# Patient Record
Sex: MALE | URBAN - METROPOLITAN AREA
[De-identification: names, ages, dates, MRNs, and addresses within clinical notes are randomized per-mention and may not be internally consistent; named-entity substitution may affect disease eponyms.]

---

## 2015-08-24 LAB
EXT 24 HR UR METANEPHRINE: ABNORMAL
EXT 24 HR UR NORMETANEPHRINE: ABNORMAL
EXT 24 HR UR NORMETANEPHRINE: ABNORMAL
EXT 25 HYDROXY VIT D2: ABNORMAL
EXT 25 HYDROXY VIT D3: ABNORMAL
EXT 5 HIAA 24 HR URINE: ABNORMAL
EXT 5 HIAA BLOOD: ABNORMAL
EXT ACTH: ABNORMAL
EXT AFP: ABNORMAL
EXT ALBUMIN: ABNORMAL
EXT ALKALINE PHOSPHATASE: ABNORMAL
EXT ALT: ABNORMAL
EXT AMYLASE: ABNORMAL
EXT ANTI ISLET CELL AB: ABNORMAL
EXT ANTI PARIETAL CELL AB: ABNORMAL
EXT ANTI THYROID AB: ABNORMAL
EXT AST: ABNORMAL
EXT BILIRUBIN DIRECT: ABNORMAL MG/DL
EXT BILIRUBIN TOTAL: ABNORMAL
EXT BK VIRUS DNA QN PCR: ABNORMAL
EXT BUN: ABNORMAL
EXT C PEPTIDE: ABNORMAL
EXT CA 125: ABNORMAL
EXT CA 19-9: ABNORMAL
EXT CA 27-29: ABNORMAL
EXT CALCITONIN: ABNORMAL
EXT CALCIUM: ABNORMAL
EXT CEA: ABNORMAL
EXT CHLORIDE: ABNORMAL
EXT CHOLESTEROL: ABNORMAL
EXT CHROMOGRANIN A: 30 NMOL/L (ref 0–5)
EXT CO2: ABNORMAL
EXT CREATININE UA: ABNORMAL
EXT CREATININE: ABNORMAL MG/DL
EXT CYCLOSPORONE LEVEL: ABNORMAL
EXT DOPAMINE: ABNORMAL
EXT EBV DNA BY PCR: ABNORMAL
EXT EPINEPHRINE: ABNORMAL
EXT FOLATE: ABNORMAL
EXT FREE T3: ABNORMAL
EXT FREE T4: ABNORMAL
EXT FSH: ABNORMAL
EXT GASTRIN RELEASING PEPTIDE: ABNORMAL
EXT GASTRIN RELEASING PEPTIDE: ABNORMAL
EXT GASTRIN: ABNORMAL
EXT GGT: ABNORMAL
EXT GHRELIN: ABNORMAL
EXT GLUCAGON: ABNORMAL
EXT GLUCOSE: ABNORMAL
EXT GROWTH HORMONE: ABNORMAL
EXT HCV RNA QUANT PCR: ABNORMAL
EXT HDL: ABNORMAL
EXT HEMATOCRIT: ABNORMAL
EXT HEMOGLOBIN A1C: ABNORMAL
EXT HEMOGLOBIN: ABNORMAL
EXT HISTAMINE 24 HR URINE: ABNORMAL
EXT HISTAMINE: ABNORMAL
EXT IGF-1: ABNORMAL
EXT IMMUNKNOW (NON-STIMULATED): ABNORMAL
EXT IMMUNKNOW (STIMULATED): ABNORMAL
EXT INR: ABNORMAL
EXT INSULIN: ABNORMAL
EXT LANREOTIDE LEVEL: ABNORMAL
EXT LDH, TOTAL: ABNORMAL
EXT LDL CHOLESTEROL: ABNORMAL
EXT LIPASE: ABNORMAL
EXT MAGNESIUM: ABNORMAL
EXT METANEPHRINE FREE PLASMA: ABNORMAL
EXT MOTILIN: ABNORMAL
EXT NEUROKININ A CAMB: ABNORMAL
EXT NEUROKININ A ISI: ABNORMAL
EXT NEUROTENSIN: ABNORMAL
EXT NOREPINEPHRINE: ABNORMAL
EXT NORMETANEPHRINE: ABNORMAL
EXT NSE: ABNORMAL
EXT OCTREOTIDE LEVEL: ABNORMAL
EXT PANCREASTATIN CAMB: ABNORMAL
EXT PANCREASTATIN ISI: ABNORMAL
EXT PANCREATIC POLYPEPTIDE: 2179.2 PG/ML (ref 0–418)
EXT PHOSPHORUS: ABNORMAL
EXT PLATELETS: ABNORMAL
EXT POTASSIUM: ABNORMAL
EXT PROGRAF LEVEL: ABNORMAL
EXT PROLACTIN: ABNORMAL
EXT PROTEIN TOTAL: ABNORMAL
EXT PROTEIN UA: ABNORMAL
EXT PT: ABNORMAL
EXT PTH, INTACT: ABNORMAL
EXT PTT: ABNORMAL
EXT RAPAMUNE LEVEL: ABNORMAL
EXT SEROTONIN: ABNORMAL
EXT SODIUM: ABNORMAL MMOL/L
EXT SOMATOSTATIN: ABNORMAL
EXT SUBSTANCE P: ABNORMAL
EXT TRIGLYCERIDES: ABNORMAL
EXT TRYPTASE: ABNORMAL
EXT TSH: ABNORMAL
EXT URIC ACID: ABNORMAL
EXT URINE AMYLASE U/HR: ABNORMAL
EXT URINE AMYLASE U/L: ABNORMAL
EXT VASOACTIVE INTESTINAL POLYPEPTIDE: ABNORMAL
EXT VITAMIN B12: ABNORMAL
EXT VMA 24 HR URINE: ABNORMAL
EXT WBC: ABNORMAL
NEURON SPECIFIC ENOLASE: ABNORMAL

## 2015-08-31 LAB
EXT 24 HR UR METANEPHRINE: NORMAL
EXT 24 HR UR NORMETANEPHRINE: NORMAL
EXT 24 HR UR NORMETANEPHRINE: NORMAL
EXT 25 HYDROXY VIT D2: NORMAL
EXT 25 HYDROXY VIT D3: NORMAL
EXT 5 HIAA 24 HR URINE: 5.5 MG/24HR (ref 0–14.9)
EXT 5 HIAA BLOOD: NORMAL
EXT ACTH: NORMAL
EXT AFP: NORMAL
EXT ALBUMIN: NORMAL
EXT ALKALINE PHOSPHATASE: NORMAL
EXT ALT: NORMAL
EXT AMYLASE: NORMAL
EXT ANTI ISLET CELL AB: NORMAL
EXT ANTI PARIETAL CELL AB: NORMAL
EXT ANTI THYROID AB: NORMAL
EXT AST: NORMAL
EXT BILIRUBIN DIRECT: NORMAL MG/DL
EXT BILIRUBIN TOTAL: NORMAL
EXT BK VIRUS DNA QN PCR: NORMAL
EXT BUN: NORMAL
EXT C PEPTIDE: NORMAL
EXT CA 125: NORMAL
EXT CA 19-9: NORMAL
EXT CA 27-29: NORMAL
EXT CALCITONIN: NORMAL
EXT CALCIUM: NORMAL
EXT CEA: NORMAL
EXT CHLORIDE: NORMAL
EXT CHOLESTEROL: NORMAL
EXT CHROMOGRANIN A: NORMAL
EXT CO2: NORMAL
EXT CREATININE UA: NORMAL
EXT CREATININE: NORMAL MG/DL
EXT CYCLOSPORONE LEVEL: NORMAL
EXT DOPAMINE: NORMAL
EXT EBV DNA BY PCR: NORMAL
EXT EPINEPHRINE: NORMAL
EXT FOLATE: NORMAL
EXT FREE T3: NORMAL
EXT FREE T4: NORMAL
EXT FSH: NORMAL
EXT GASTRIN RELEASING PEPTIDE: NORMAL
EXT GASTRIN RELEASING PEPTIDE: NORMAL
EXT GASTRIN: NORMAL
EXT GGT: NORMAL
EXT GHRELIN: NORMAL
EXT GLUCAGON: NORMAL
EXT GLUCOSE: NORMAL
EXT GROWTH HORMONE: NORMAL
EXT HCV RNA QUANT PCR: NORMAL
EXT HDL: NORMAL
EXT HEMATOCRIT: NORMAL
EXT HEMOGLOBIN A1C: NORMAL
EXT HEMOGLOBIN: NORMAL
EXT HISTAMINE 24 HR URINE: NORMAL
EXT HISTAMINE: NORMAL
EXT IGF-1: NORMAL
EXT IMMUNKNOW (NON-STIMULATED): NORMAL
EXT IMMUNKNOW (STIMULATED): NORMAL
EXT INR: NORMAL
EXT INSULIN: NORMAL
EXT LANREOTIDE LEVEL: NORMAL
EXT LDH, TOTAL: NORMAL
EXT LDL CHOLESTEROL: NORMAL
EXT LIPASE: NORMAL
EXT MAGNESIUM: NORMAL
EXT METANEPHRINE FREE PLASMA: NORMAL
EXT MOTILIN: NORMAL
EXT NEUROKININ A CAMB: NORMAL
EXT NEUROKININ A ISI: NORMAL
EXT NEUROTENSIN: NORMAL
EXT NOREPINEPHRINE: NORMAL
EXT NORMETANEPHRINE: NORMAL
EXT NSE: NORMAL
EXT OCTREOTIDE LEVEL: NORMAL
EXT PANCREASTATIN CAMB: NORMAL
EXT PANCREASTATIN ISI: NORMAL
EXT PANCREATIC POLYPEPTIDE: NORMAL
EXT PHOSPHORUS: NORMAL
EXT PLATELETS: NORMAL
EXT POTASSIUM: NORMAL
EXT PROGRAF LEVEL: NORMAL
EXT PROLACTIN: NORMAL
EXT PROTEIN TOTAL: NORMAL
EXT PROTEIN UA: NORMAL
EXT PT: NORMAL
EXT PTH, INTACT: NORMAL
EXT PTT: NORMAL
EXT RAPAMUNE LEVEL: NORMAL
EXT SEROTONIN: NORMAL
EXT SODIUM: NORMAL MMOL/L
EXT SOMATOSTATIN: NORMAL
EXT SUBSTANCE P: NORMAL
EXT TRIGLYCERIDES: NORMAL
EXT TRYPTASE: NORMAL
EXT TSH: NORMAL
EXT URIC ACID: NORMAL
EXT URINE AMYLASE U/HR: NORMAL
EXT URINE AMYLASE U/L: NORMAL
EXT VASOACTIVE INTESTINAL POLYPEPTIDE: NORMAL
EXT VITAMIN B12: NORMAL
EXT VMA 24 HR URINE: NORMAL
EXT WBC: NORMAL
NEURON SPECIFIC ENOLASE: NORMAL

## 2015-12-01 LAB

## 2016-06-21 LAB

## 2016-06-22 LAB

## 2016-07-19 LAB

## 2016-07-21 LAB

## 2016-09-19 LAB

## 2016-09-20 LAB

## 2016-11-22 LAB
EXT 24 HR UR METANEPHRINE: NORMAL
EXT 24 HR UR NORMETANEPHRINE: NORMAL
EXT 24 HR UR NORMETANEPHRINE: NORMAL
EXT 25 HYDROXY VIT D2: NORMAL
EXT 25 HYDROXY VIT D3: NORMAL
EXT 5 HIAA 24 HR URINE: NORMAL
EXT 5 HIAA BLOOD: NORMAL
EXT ACTH: NORMAL
EXT AFP: NORMAL
EXT ALBUMIN: NORMAL
EXT ALKALINE PHOSPHATASE: NORMAL
EXT ALT: NORMAL
EXT AMYLASE: NORMAL
EXT ANTI ISLET CELL AB: NORMAL
EXT ANTI PARIETAL CELL AB: NORMAL
EXT ANTI THYROID AB: NORMAL
EXT AST: NORMAL
EXT BILIRUBIN DIRECT: NORMAL MG/DL
EXT BILIRUBIN TOTAL: NORMAL
EXT BK VIRUS DNA QN PCR: NORMAL
EXT BUN: NORMAL
EXT C PEPTIDE: NORMAL
EXT CA 125: NORMAL
EXT CA 19-9: NORMAL
EXT CA 27-29: NORMAL
EXT CALCITONIN: NORMAL
EXT CALCIUM: NORMAL
EXT CEA: NORMAL
EXT CHLORIDE: NORMAL
EXT CHOLESTEROL: NORMAL
EXT CHROMOGRANIN A: <1 PG/ML (ref 0–5)
EXT CO2: NORMAL
EXT CREATININE UA: NORMAL
EXT CREATININE: NORMAL MG/DL
EXT CYCLOSPORONE LEVEL: NORMAL
EXT DOPAMINE: NORMAL
EXT EBV DNA BY PCR: NORMAL
EXT EPINEPHRINE: NORMAL
EXT FOLATE: NORMAL
EXT FREE T3: NORMAL
EXT FREE T4: NORMAL
EXT FSH: NORMAL
EXT GASTRIN RELEASING PEPTIDE: NORMAL
EXT GASTRIN RELEASING PEPTIDE: NORMAL
EXT GASTRIN: NORMAL
EXT GGT: NORMAL
EXT GHRELIN: NORMAL
EXT GLUCAGON: NORMAL
EXT GLUCOSE: NORMAL
EXT GROWTH HORMONE: NORMAL
EXT HCV RNA QUANT PCR: NORMAL
EXT HDL: NORMAL
EXT HEMATOCRIT: NORMAL
EXT HEMOGLOBIN A1C: NORMAL
EXT HEMOGLOBIN: NORMAL
EXT HISTAMINE 24 HR URINE: NORMAL
EXT HISTAMINE: NORMAL
EXT IGF-1: NORMAL
EXT IMMUNKNOW (NON-STIMULATED): NORMAL
EXT IMMUNKNOW (STIMULATED): NORMAL
EXT INR: NORMAL
EXT INSULIN: NORMAL
EXT LANREOTIDE LEVEL: NORMAL
EXT LDH, TOTAL: NORMAL
EXT LDL CHOLESTEROL: NORMAL
EXT LIPASE: NORMAL
EXT MAGNESIUM: NORMAL
EXT METANEPHRINE FREE PLASMA: NORMAL
EXT MOTILIN: NORMAL
EXT NEUROKININ A CAMB: NORMAL
EXT NEUROKININ A ISI: NORMAL
EXT NEUROTENSIN: NORMAL
EXT NOREPINEPHRINE: NORMAL
EXT NORMETANEPHRINE: NORMAL
EXT NSE: NORMAL
EXT OCTREOTIDE LEVEL: NORMAL
EXT PANCREASTATIN CAMB: NORMAL
EXT PANCREASTATIN ISI: 61 PG/ML (ref 10–135)
EXT PANCREATIC POLYPEPTIDE: 49.8 PG/ML (ref 0–418)
EXT PHOSPHORUS: NORMAL
EXT PLATELETS: NORMAL
EXT POTASSIUM: NORMAL
EXT PROGRAF LEVEL: NORMAL
EXT PROLACTIN: NORMAL
EXT PROTEIN TOTAL: NORMAL
EXT PROTEIN UA: NORMAL
EXT PT: NORMAL
EXT PTH, INTACT: NORMAL
EXT PTT: NORMAL
EXT RAPAMUNE LEVEL: NORMAL
EXT SEROTONIN: NORMAL
EXT SODIUM: NORMAL MMOL/L
EXT SOMATOSTATIN: NORMAL
EXT SUBSTANCE P: NORMAL
EXT TRIGLYCERIDES: NORMAL
EXT TRYPTASE: NORMAL
EXT TSH: NORMAL
EXT URIC ACID: NORMAL
EXT URINE AMYLASE U/HR: NORMAL
EXT URINE AMYLASE U/L: NORMAL
EXT VASOACTIVE INTESTINAL POLYPEPTIDE: NORMAL
EXT VITAMIN B12: NORMAL
EXT VMA 24 HR URINE: NORMAL
EXT WBC: NORMAL
NEURON SPECIFIC ENOLASE: NORMAL

## 2017-01-05 ENCOUNTER — TELEPHONE (OUTPATIENT)
Dept: NEUROLOGY | Facility: HOSPITAL | Age: 62
End: 2017-01-05

## 2017-01-05 RX ORDER — LANREOTIDE ACETATE 120 MG/.5ML
120 INJECTION SUBCUTANEOUS
COMMUNITY

## 2017-01-05 RX ORDER — HYDROCHLOROTHIAZIDE 12.5 MG/1
12.5 TABLET ORAL DAILY
COMMUNITY

## 2017-01-05 RX ORDER — METFORMIN HYDROCHLORIDE 500 MG/1
500 TABLET ORAL 2 TIMES DAILY WITH MEALS
COMMUNITY

## 2017-01-05 RX ORDER — RAMIPRIL 10 MG/1
10 CAPSULE ORAL DAILY
COMMUNITY

## 2017-01-05 RX ORDER — DILTIAZEM HYDROCHLORIDE 300 MG/1
300 CAPSULE, EXTENDED RELEASE ORAL DAILY
COMMUNITY

## 2017-01-05 NOTE — TELEPHONE ENCOUNTER
----- Message from Eladia Arechiga sent at 1/3/2017  3:24 PM CST -----  Contact: Patient  NEW/EAW - patient sees Dr. Master Guevara, and has previously spoken with Dr. Dubon and our office before. Wants to send new imaging/reports here and have Dr. Mckenzie take a look at them to see if he is a surgery candidate. Patient is getting mixed opinions at home. Please call the patient at [phone]/cell

## 2017-01-05 NOTE — LETTER
January 5, 2017    Tristen Alejo  [address] [place] [address]             [facility]  [facility]  [address]  [address]  [place] [address]  Phone: [phone]  Fax: [phone] Dear Mr. Aeljo:    Thank you for your interest in our program. It was my pleasure speaking with you today about your upcoming appointment.     Our office is located at :    [facility]  [address], [address]  [place], [address]    Dr Mckenzie will review your records and your images and get back in touch with you regarding possible surgery.    In preparation for your appointment, we ask that you gather the information below and fax them to us ASAP. This will enable us to review all pertinent information at the time of your visit so that recommendations can be made and a plan of care developed for you.     Please return forms along with all paper records via fax asap.    Please fax  1. Insurance cards (front and back)-enlarged if possible  2. Drivers licenses  3. Current medicine list  4. Name, address & phone # of your physician for correspondence  5. Authorization for Release of Information-complete and return to clinic  6. Medical Records-send as soon as you have them together (see guidelines below)    Lab Work: If requested, the special lab markers do require special tubes that have to be ordered by the ordering facility. The lab work should be within 3 months.. The labs take 2-3 weeks to get results so please get labs drawn asap. The name and phone # of the send out lab that does the special labs tests is on the order. You can have the labs drawn at [facility], a local hospital, or your doctors office (whichever works). If these lab tests need to be done, I will attach an Outpatient Order form. If you are doing your lab work at a facility other than [facility], call our office to notify us the date you  have them drawn and the location and phone number of the lab for easy follow up.    Scans: Please mail copies of CD's of your last scans to the office asap. I would recommend sending them overnight with a tracking number in case of any problems. If you need updated scans, I will attach an Outpatient Order form.    Tissue Biopsy/Pathology: If you had a tissue biopsy or surgery, we will request to have your slides and/or tissue blocks sent to us to perform some special testing on them. Please provide us with a pathology report asa. This testing will be billed to your insurance company.     Operative or Procedure reports: All surgery or procedure reports related to your neuroendocrine tumor should be sent to us.    Insurance Company: You should contact your insurance company to inquire about your insurance coverage and benefits. Ask about co-payments and deductibles when seeing a specialist. Ask if this visit will be in Network or Out of Network. We may be able to work with you if this is out of network for you.    Lodging: Attached is lodging information from the [facility] and a list of local hotels. [facility] [place] is run by the [employer] and is free of charge. If you would like to stay at the [facility], you must call my office and talk to [facility]. You will need to complete the application and send it to my office for a physician signature. We will forward it to the [facility] [place] and they will check availability. If you wish to stay at the [facility], apply EARLY, they fill up quickly. You may contact the [facility] a week later to confirm your reservation and ask any questions regarding the facility. You  May only stay at the [facility] 24 hrs prior to your appointment and up to 24 hrs after your appointment. (THIS CAN ONLY BE USED IF YOU LIVE MORE THAN 40 MILES FROM OUR FACILITY).    Call me if you have any questions, email is not the best way to communicate with our office.    We are looking forward to meeting and  taking care of you. If you have any questions or concerns, please don't hesitate to call.     Sincerely,        Valery Bernard, RN, BSN  Nurse Manager, Neuroendocrine Tumor Program

## 2017-01-10 ENCOUNTER — TELEPHONE (OUTPATIENT)
Dept: NEUROLOGY | Facility: HOSPITAL | Age: 62
End: 2017-01-10

## 2017-01-10 NOTE — TELEPHONE ENCOUNTER
Spoke with pt.  He is inquiring about the ga 68 PET /CT scan cost.  Informed that his greatest OOP exposure is $5400.  [facility]16,000 [facility] Ga 68 PET/CT.  He sent records and is asking if Dr. Mckenzie can review and let him know if surgery is a potential option. Reviewed record status.  He will send some reports of recent oscan, CT and Ga 68 scans. Will request Dr. Mckenzie to review images to give guidance on next steps.

## 2017-01-24 ENCOUNTER — TELEPHONE (OUTPATIENT)
Dept: NEUROLOGY | Facility: HOSPITAL | Age: 62
End: 2017-01-24

## 2017-01-24 NOTE — TELEPHONE ENCOUNTER
Returned call to patient. Information was received and given to Dr. Mckenzie for review and someone would be notifying him in the next couple of days,

## 2017-01-24 NOTE — TELEPHONE ENCOUNTER
----- Message from Nallely Ordoñez sent at 1/24/2017 12:59 PM CST -----  JPB- Patient called wanting to know if [facility] ever looked at his records to see if he is a good surgery candidate. Please call patient back at [phone]. Thanks

## 2017-01-27 ENCOUNTER — DOCUMENTATION ONLY (OUTPATIENT)
Dept: NEUROLOGY | Facility: HOSPITAL | Age: 62
End: 2017-01-27

## 2017-01-27 ENCOUNTER — TELEPHONE (OUTPATIENT)
Dept: NEUROLOGY | Facility: HOSPITAL | Age: 62
End: 2017-01-27

## 2017-01-27 NOTE — TELEPHONE ENCOUNTER
----- Message from Nallely Ordoñez sent at 1/27/2017 10:24 AM CST -----  JPB- Patient called regarding his medical records. Please call patient back at [phone]. Thanks

## 2017-01-27 NOTE — TELEPHONE ENCOUNTER
Spoke with pt stating that wanted to know if Dr. Mckenzie was able to review his records to see if he was a surgical candidate. Forward information to Dr. Mckenzie for review.

## 2017-01-28 NOTE — PROGRESS NOTES
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  PT wants to know i f surgical candidate.  Looks resectable/nanoknife but suggestd 3 mos of Cap/Tem first to assess response and if so may make resection less challenging.    Pt will discuss withesther spivey wish to see dr Berrios for F/U and get his Ga scan here as well

## 2018-09-05 ENCOUNTER — IMPORTED ENCOUNTER (OUTPATIENT)
Dept: URBAN - METROPOLITAN AREA CLINIC 9 | Facility: CLINIC | Age: 63
End: 2018-09-05

## 2019-01-02 ENCOUNTER — IMPORTED ENCOUNTER (OUTPATIENT)
Dept: URBAN - METROPOLITAN AREA CLINIC 9 | Facility: CLINIC | Age: 64
End: 2019-01-02

## 2019-02-06 ENCOUNTER — IMPORTED ENCOUNTER (OUTPATIENT)
Dept: URBAN - METROPOLITAN AREA CLINIC 9 | Facility: CLINIC | Age: 64
End: 2019-02-06

## 2019-08-07 ENCOUNTER — IMPORTED ENCOUNTER (OUTPATIENT)
Dept: URBAN - METROPOLITAN AREA CLINIC 9 | Facility: CLINIC | Age: 64
End: 2019-08-07

## 2019-09-23 ENCOUNTER — IMPORTED ENCOUNTER (OUTPATIENT)
Dept: URBAN - METROPOLITAN AREA CLINIC 9 | Facility: CLINIC | Age: 64
End: 2019-09-23

## 2020-08-24 NOTE — PATIENT DISCUSSION
POSTERIOR VITREOUS DETACHMENT; QUESTIONABLE OD_: SCLERAL DEPRESSION PERFORMED TODAY - NO HOLES, TEARS, OR RETINAL DETACHMENTS TODAY. ADVISED PT TO CALL IF ANY FLASHES OF LIGHT, INCREASE IN FLOATERS, OR DECREASE VISION IN EITHER EYE.

## 2020-09-24 ENCOUNTER — IMPORTED ENCOUNTER (OUTPATIENT)
Dept: URBAN - METROPOLITAN AREA CLINIC 9 | Facility: CLINIC | Age: 65
End: 2020-09-24

## 2021-09-27 ENCOUNTER — IMPORTED ENCOUNTER (OUTPATIENT)
Dept: URBAN - METROPOLITAN AREA CLINIC 9 | Facility: CLINIC | Age: 66
End: 2021-09-27

## 2021-10-18 ASSESSMENT — TONOMETRY
OD_IOP_MMHG: 15
OS_IOP_MMHG: 17
OD_IOP_MMHG: 16
OS_IOP_MMHG: 16
OD_IOP_MMHG: 15
OS_IOP_MMHG: 15
OD_IOP_MMHG: 20
OD_IOP_MMHG: 15
OS_IOP_MMHG: 18
OS_IOP_MMHG: 18
OS_IOP_MMHG: 16
OS_IOP_MMHG: 15
OD_IOP_MMHG: 16
OD_IOP_MMHG: 15

## 2021-10-18 ASSESSMENT — KERATOMETRY
OD_AXISANGLE_DEGREES: 179
OD_K1POWER_DIOPTERS: 41.5
OS_K1POWER_DIOPTERS: 41.5
OS_AXISANGLE2_DEGREES: 100
OS_K2POWER_DIOPTERS: 42.5
OD_K1POWER_DIOPTERS: 41.75
OD_K2POWER_DIOPTERS: 42.5
OS_K2POWER_DIOPTERS: 42.5
OS_AXISANGLE2_DEGREES: 98
OD_AXISANGLE_DEGREES: 171
OS_AXISANGLE_DEGREES: 9
OS_K1POWER_DIOPTERS: 41.5
OD_AXISANGLE2_DEGREES: 81
OS_K2POWER_DIOPTERS: 42.5
OS_K1POWER_DIOPTERS: 41.5
OS_AXISANGLE2_DEGREES: 99
OS_AXISANGLE_DEGREES: 8
OS_K2POWER_DIOPTERS: 43
OD_K2POWER_DIOPTERS: 43
OS_K1POWER_DIOPTERS: 41.5
OS_AXISANGLE_DEGREES: 10
OD_AXISANGLE_DEGREES: 176
OD_AXISANGLE_DEGREES: 5
OS_AXISANGLE2_DEGREES: 98
OD_K2POWER_DIOPTERS: 42.75
OD_K1POWER_DIOPTERS: 41.5
OD_AXISANGLE2_DEGREES: 89
OS_AXISANGLE_DEGREES: 8
OD_AXISANGLE2_DEGREES: 86
OD_K1POWER_DIOPTERS: 41.5
OD_AXISANGLE2_DEGREES: 95
OD_K2POWER_DIOPTERS: 42.75

## 2021-10-18 ASSESSMENT — VISUAL ACUITY
OD_CC: 20/25 SN
OD_SC: 20/25 - SN
OS_SC: 20/70 SN
OD_SC: 20/30 SN
OS_SC: 20/30 SN
OS_SC: 20/70 - SN
OS_CC: 20/25 SN
OS_CC: 20/25 + SN
OD_CC: 20/25 - SN
OS_CC: 20/20 SN
OD_CC: 20/20 -2 SN
OD_CC: 20/25 SN
OD_CC: 20/20 SN
OD_CC: 20/25 -2 SN
OD_CC: 20/25 SN
OS_CC: 20/20 -2 SN
OD_SC: 20/30 SN
OD_CC: 20/25 + SN
OS_CC: 20/20 SN
OD_CC: 20/20 - SN
OS_CC: 20/25 SN
OS_CC: 20/25 SN
OS_SC: 20/40 -2 SN
OS_CC: 20/25 - SN
OS_CC: 20/25 SN
OD_CC: 20/25 SN
OD_CC: 20/20 SN
OS_CC: 20/30 - SN
OD_SC: 20/30 + SN

## 2022-04-08 NOTE — TELEPHONE ENCOUNTER
Complete history obtained from patient and he will send us all of his records and images.  Diagnosed incidentally by having and U/S of his kidneys.  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  Has had Y-90 spheres in 10/2015 with good response and is on Lanreotide injections every 28 days.     The patient has been notified of this information and all questions answered.  Phone review scheduled in 1 week.

## 2022-07-08 RX ORDER — HYDROCHLOROTHIAZIDE 12.5 MG/1
CAPSULE, GELATIN COATED ORAL
COMMUNITY

## 2022-07-08 RX ORDER — DILTIAZEM HYDROCHLORIDE 300 MG/1
CAPSULE, EXTENDED RELEASE ORAL
COMMUNITY

## 2022-07-08 RX ORDER — ZOLPIDEM TARTRATE 12.5 MG/1
TABLET, FILM COATED, EXTENDED RELEASE ORAL
COMMUNITY
Start: 2022-01-11

## 2022-07-08 RX ORDER — LEMBOREXANT 5 MG/1
TABLET, FILM COATED ORAL
COMMUNITY
Start: 2021-11-24

## 2022-07-08 RX ORDER — GABAPENTIN 300 MG/1
1 CAPSULE ORAL
COMMUNITY

## 2022-07-08 RX ORDER — AMLODIPINE BESYLATE 10 MG/1
TABLET ORAL
COMMUNITY

## 2022-07-08 RX ORDER — LOSARTAN POTASSIUM 100 MG/1
TABLET ORAL
COMMUNITY

## 2022-07-08 RX ORDER — RAMIPRIL 10 MG/1
1 CAPSULE ORAL
COMMUNITY

## 2022-07-08 RX ORDER — ZOLPIDEM TARTRATE 10 MG/1
TABLET ORAL
COMMUNITY
Start: 2022-01-27

## 2022-07-08 RX ORDER — SIMVASTATIN 10 MG
TABLET ORAL
COMMUNITY

## 2022-09-28 ENCOUNTER — ESTABLISHED PATIENT (OUTPATIENT)
Dept: URBAN - METROPOLITAN AREA CLINIC 9 | Facility: CLINIC | Age: 67
End: 2022-09-28

## 2022-09-28 DIAGNOSIS — H02.831: ICD-10-CM

## 2022-09-28 DIAGNOSIS — H04.123: ICD-10-CM

## 2022-09-28 DIAGNOSIS — H25.13: ICD-10-CM

## 2022-09-28 DIAGNOSIS — H02.834: ICD-10-CM

## 2022-09-28 DIAGNOSIS — H11.153: ICD-10-CM

## 2022-09-28 DIAGNOSIS — R73.09: ICD-10-CM

## 2022-09-28 DIAGNOSIS — H43.813: ICD-10-CM

## 2022-09-28 PROCEDURE — 92015 DETERMINE REFRACTIVE STATE: CPT

## 2022-09-28 PROCEDURE — 92014 COMPRE OPH EXAM EST PT 1/>: CPT

## 2022-09-28 ASSESSMENT — KERATOMETRY
OS_K2POWER_DIOPTERS: 42.50
OS_AXISANGLE_DEGREES: 011
OD_AXISANGLE2_DEGREES: 89
OS_K1POWER_DIOPTERS: 41.50
OD_K1POWER_DIOPTERS: 41.50
OD_K2POWER_DIOPTERS: 42.75
OD_AXISANGLE_DEGREES: 179
OS_AXISANGLE2_DEGREES: 101

## 2022-09-28 ASSESSMENT — VISUAL ACUITY
OS_CC: 20/25
OD_CC: 20/30
OD_SC: 20/30-2
OU_CC: 20/20-2
OU_SC: 20/25
OS_SC: 20/25

## 2022-09-28 ASSESSMENT — TONOMETRY
OD_IOP_MMHG: 18
OS_IOP_MMHG: 18

## 2022-10-14 PROBLEM — C7A.8 OTHER MALIGNANT NEUROENDOCRINE TUMORS (HCC): Status: ACTIVE | Noted: 2022-10-14

## 2023-10-02 ENCOUNTER — ESTABLISHED PATIENT (OUTPATIENT)
Facility: LOCATION | Age: 68
End: 2023-10-02

## 2023-10-02 DIAGNOSIS — H52.223: ICD-10-CM

## 2023-10-02 DIAGNOSIS — H04.123: ICD-10-CM

## 2023-10-02 DIAGNOSIS — R73.09: ICD-10-CM

## 2023-10-02 DIAGNOSIS — H25.13: ICD-10-CM

## 2023-10-02 PROCEDURE — 92015 DETERMINE REFRACTIVE STATE: CPT

## 2023-10-02 PROCEDURE — 92014 COMPRE OPH EXAM EST PT 1/>: CPT

## 2023-10-02 ASSESSMENT — TONOMETRY
OD_IOP_MMHG: 16
OS_IOP_MMHG: 17

## 2023-10-02 ASSESSMENT — VISUAL ACUITY
OD_CC: 20/25+1
OD_SC: 20/30-2
OU_SC: 20/25-1
OU_CC: 20/20-2
OS_CC: 20/30-1
OD_GLARE: 20/20
OS_SC: 20/30-2
OS_GLARE: 20/30

## 2023-10-02 ASSESSMENT — KERATOMETRY
OD_AXISANGLE_DEGREES: 172
OD_K2POWER_DIOPTERS: 42.50
OS_K2POWER_DIOPTERS: 42.50
OS_K1POWER_DIOPTERS: 41.50
OD_K1POWER_DIOPTERS: 41.50
OS_AXISANGLE2_DEGREES: 99
OS_AXISANGLE_DEGREES: 9
OD_AXISANGLE2_DEGREES: 82

## 2024-10-18 ENCOUNTER — COMPREHENSIVE EXAM (OUTPATIENT)
Facility: LOCATION | Age: 69
End: 2024-10-18

## 2024-10-18 DIAGNOSIS — H11.153: ICD-10-CM

## 2024-10-18 DIAGNOSIS — H04.123: ICD-10-CM

## 2024-10-18 DIAGNOSIS — H02.831: ICD-10-CM

## 2024-10-18 DIAGNOSIS — H25.13: ICD-10-CM

## 2024-10-18 DIAGNOSIS — H43.813: ICD-10-CM

## 2024-10-18 PROCEDURE — 92015 DETERMINE REFRACTIVE STATE: CPT

## 2024-10-18 PROCEDURE — 92014 COMPRE OPH EXAM EST PT 1/>: CPT
